# Patient Record
Sex: MALE | Race: WHITE | Employment: OTHER | URBAN - METROPOLITAN AREA
[De-identification: names, ages, dates, MRNs, and addresses within clinical notes are randomized per-mention and may not be internally consistent; named-entity substitution may affect disease eponyms.]

---

## 2018-08-17 ENCOUNTER — APPOINTMENT (EMERGENCY)
Dept: RADIOLOGY | Facility: HOSPITAL | Age: 63
End: 2018-08-17
Payer: MEDICARE

## 2018-08-17 ENCOUNTER — HOSPITAL ENCOUNTER (EMERGENCY)
Facility: HOSPITAL | Age: 63
Discharge: LEFT BEFORE DISCHARGE | End: 2018-08-17
Attending: EMERGENCY MEDICINE
Payer: MEDICARE

## 2018-08-17 VITALS
RESPIRATION RATE: 20 BRPM | OXYGEN SATURATION: 97 % | SYSTOLIC BLOOD PRESSURE: 167 MMHG | DIASTOLIC BLOOD PRESSURE: 105 MMHG | WEIGHT: 300 LBS | HEART RATE: 87 BPM | TEMPERATURE: 98.9 F

## 2018-08-17 DIAGNOSIS — S72.001A CLOSED RIGHT HIP FRACTURE (HCC): Primary | ICD-10-CM

## 2018-08-17 PROCEDURE — 99283 EMERGENCY DEPT VISIT LOW MDM: CPT

## 2018-08-17 PROCEDURE — 73502 X-RAY EXAM HIP UNI 2-3 VIEWS: CPT

## 2018-08-17 RX ORDER — ACETAMINOPHEN 325 MG/1
975 TABLET ORAL ONCE
Status: DISCONTINUED | OUTPATIENT
Start: 2018-08-17 | End: 2018-08-17 | Stop reason: HOSPADM

## 2018-08-17 RX ORDER — ACETAMINOPHEN 500 MG
500 TABLET ORAL EVERY 6 HOURS PRN
Qty: 30 TABLET | Refills: 0 | Status: SHIPPED | OUTPATIENT
Start: 2018-08-17

## 2018-08-17 RX ORDER — IBUPROFEN 400 MG/1
400 TABLET ORAL EVERY 6 HOURS PRN
Qty: 30 TABLET | Refills: 0 | Status: SHIPPED | OUTPATIENT
Start: 2018-08-17

## 2018-08-17 RX ORDER — IBUPROFEN 400 MG/1
800 TABLET ORAL ONCE
Status: DISCONTINUED | OUTPATIENT
Start: 2018-08-17 | End: 2018-08-17 | Stop reason: HOSPADM

## 2018-08-17 NOTE — ED PROVIDER NOTES
History  Chief Complaint   Patient presents with    Fall     Patient reports falling and "twisting my bad hip when I went down " Patient called 911 and they helped him get up and back into his chair but felt he needed to come to the ER for "a few pain killers to get me through until I see my family doctor " Patient denies LOC or hitting his head when he fell  History provided by:  Patient (Female significant other in room )   used: No    Fall   Mechanism of injury comment:  Slip and fall from chair  Injury location: Right hip pain  Patient denies any other injury  Patient denies striking head  Patient denies loss of conscious  Incident location: At home  Prior to arrival data:     Loss of consciousness: no      Amnesic to event: no      Airway interventions:  None    Medications administered:  None  Associated symptoms: no abdominal pain, no back pain, no difficulty breathing, no nausea and no vomiting    Risk factors: no anticoagulation therapy and no asthma    Risk factors comment:  Recently just taken off of warfarin      None       Past Medical History:   Diagnosis Date    A-fib ([facility] [address] )     Hypertension        Past Surgical History:   Procedure Laterality Date    CARDIAC DEFIBRILLATOR PLACEMENT      TOTAL HIP ARTHROPLASTY Left        History reviewed  No pertinent family history  I have reviewed and agree with the history as documented  Social History   Substance Use Topics    Smoking status: Former Smoker    Smokeless tobacco: Never Used    Alcohol use No        Review of Systems   Constitutional: Negative for chills and fever  HENT: Negative for ear pain  Eyes: Negative for redness  Respiratory: Negative for cough  Cardiovascular: Positive for leg swelling  Chronic right leg lower extremity swelling no new complaints  Gastrointestinal: Negative for abdominal pain, nausea and vomiting  Endocrine: Negative for heat intolerance     Genitourinary: Negative for flank pain  Musculoskeletal: Positive for arthralgias  Negative for back pain  Right hip pain  Skin: Negative for pallor  Allergic/Immunologic: Negative for food allergies  Hematological: Negative for adenopathy  Psychiatric/Behavioral: Negative for agitation  Physical Exam  Physical Exam   Constitutional: He is oriented to person, place, and time  He appears well-developed and well-nourished  No distress  HENT:   Head: Normocephalic and atraumatic  Eyes: Conjunctivae are normal    Neck: Neck supple  No JVD present  Cardiovascular: Normal rate  Pulmonary/Chest: Effort normal    Abdominal:   Morbidly obese abdomen  Genitourinary:   Genitourinary Comments: No complaints deferred  Musculoskeletal: He exhibits tenderness  Legs:  Neurological: He is alert and oriented to person, place, and time  He displays normal reflexes  No sensory deficit  He exhibits normal muscle tone  Coordination normal    Skin: Skin is warm and dry  Capillary refill takes less than 2 seconds  Psychiatric: He has a normal mood and affect   His behavior is normal  Judgment and thought content normal        Vital Signs  ED Triage Vitals [08/17/18 0006]   Temperature Pulse Respirations Blood Pressure SpO2   98 9 °F (37 2 °C) 87 20 (!) 167/105 97 %      Temp Source Heart Rate Source Patient Position - Orthostatic VS BP Location FiO2 (%)   Oral Monitor Sitting Right arm --      Pain Score       8           Vitals:    08/17/18 0006   BP: (!) 167/105   Pulse: 87   Patient Position - Orthostatic VS: Sitting       Visual Acuity      ED Medications  Medications   ibuprofen (MOTRIN) tablet 800 mg (800 mg Oral Not Given 8/17/18 0230)   acetaminophen (TYLENOL) tablet 975 mg (975 mg Oral Not Given 8/17/18 0230)       Diagnostic Studies  Results Reviewed     None                 XR hip/pelv 2-3 vws right   ED Interpretation by Breana Pham MD (08/17 9275)   Subcapital fracture of the right femoral neck Final Result by Ta Saaverda DO (08/17 0151)      Subcapital fracture of the right femoral neck            Workstation performed: [id number]                    Procedures  Procedures       Phone Contacts  ED Phone Contact    ED Course                               MDM  Number of Diagnoses or Management Options  Closed right hip fracture [facility]):   Diagnosis management comments: 70-year-old male presents emergency department status slip and fall from his chair at home  Patient denies loss of consciousness  Patient denies striking head  Patient denies neck pain  Patient does admit that he has been off his warfarin for up to 2-3 weeks  Patient states that the way he fell he had significant right hip pain  X-ray does demonstrate hip fracture  Patient recommended to be placed in inpatient status with orthopedic follow-up in the a m Sherre Soulier At this time patient is requesting to leave AMA due to home needs  Patient states that he needed to put his comfortable car roof up  Security as well as myself and nursing team were going to help patient go to his car and close his roof but patient continued to seek AMA status  Patient had female significant other with him in the room and became very tearful that the patient was getting Percocet for his pain  Patient pain was treated in the department with NSAIDs and Tylenol  Patient was expected to be in patient but had requested wanting to see his prior orthopedic surgeon who performed his left hip surgery  I explained that patient would need to be transferred to [facility] for this evaluation and management  Patient was initially agreeable but after discussion with his female significant other wanted to leave AMA status and then follow up next day at [facility]  I educated patient of AMA status  I did educate patient that he may have worsening symptoms permanent disability, permanent disability from work ability  Patient states that he is aware    Patient was seen and evaluated by Dr Pool Flores the attending any reviewed with the patient AMA status  Patient is bright alert and I feel is able to make appropriate decision  Patient was educated that he may return at any time for further evaluation and management  Patient was agreeable  Patient was discharged in stable condition  Amount and/or Complexity of Data Reviewed  Tests in the radiology section of CPT®: ordered and reviewed      CritCare Time    Disposition  Final diagnoses:   Closed right hip fracture ([address] )     Time reflects when diagnosis was documented in both MDM as applicable and the Disposition within this note     Time User Action Codes Description Comment    8/17/2018  2:21 AM Kimmie Teixeira Add [S72 001A] Closed right hip fracture [facility])       ED Disposition     ED Disposition Condition Comment    AMA  Date: 8/17/2018  Patient: Nik Alarcon  Admitted: 8/17/2018 12:10 AM  Attending Provider: Avelino Crawford MD    Nik Alarcon or his authorized caregiver has made the decision for the patient to leave the emergency department against the advice of attending physi jose  He or his authorized caregiver has been informed and understands the inherent risks, including death, progressive worsening fracture, worsening hip pain  He or his authorized caregiver has decided to accept the responsibility for this decision  Ambar Apa and all necessary parties have been advised that he may return for further evaluation or treatment  His condition at time of discharge was stable    Nik Alarcon had current vital signs as follows:  BP (!) 167/105 (BP Location: Right arm)   Pulse 87    Temp 98 9 °F (37 2 °C) (Oral)   Resp 20   Wt 136 kg (300 lb)         Follow-up Information     Follow up With Specialties Details Why Contact Info       Follow-up with your known hip surgery as soon as possible     Arya Glasgow MD Orthopedic Surgery   [address] [address]            Discharge Medication List as of 8/17/2018  2:30 AM      START taking these medications    Details   acetaminophen (TYLENOL) 500 mg tablet Take 1 tablet (500 mg total) by mouth every 6 (six) hours as needed for mild pain, moderate pain, headaches or fever, Starting Fri 8/17/2018, Print      ibuprofen (MOTRIN) 400 mg tablet Take 1 tablet (400 mg total) by mouth every 6 (six) hours as needed for mild pain, Starting Fri 8/17/2018, Print           No discharge procedures on file      ED Provider  Electronically Signed by           Sangeeta Parkinson PA-C  08/17/18 1929

## 2018-08-17 NOTE — ED ATTENDING ATTESTATION
Natty Godoy MD, saw and evaluated the patient  I have discussed the patient with the resident/non-physician practitioner and agree with the resident's/non-physician practitioner's findings, Plan of Care, and MDM as documented in the resident's/non-physician practitioner's note, except where noted  All available labs and Radiology studies were reviewed  At this point I agree with the current assessment done in the Emergency Department  I have conducted an independent evaluation of this patient a history and physical is as follows:  75-year-old male presents for evaluation of right hip pain after twisting and falling  Patient complains of isolated right hip pain  Denies striking his head, loss of consciousness and denies other traumatic injuries, chest pain, shortness of breath  Ten systems reviewed otherwise negative   Exam acute distress, HEENT trauma normal, neuro normal, lungs normal cardiac normal, abdomen normal, bilateral upper extremity trauma exam is within normal limits, left lower extremity trauma exam within normal limit, right hip tender palpation, right femur/knee/tib-fib/ankle exam within normal limits for medical decision making;-right hip pain-will x-ray to rule out fracture, reassess    Critical Care Time  CritCare Time    Procedures

## 2018-08-17 NOTE — DISCHARGE INSTRUCTIONS
Hip Fracture   WHAT YOU NEED TO KNOW:   A hip fracture is a break in the upper part of your femur (thigh bone)  The upper part of your femur includes the femoral head and the femoral neck  A hip fracture is often caused by a fall or injury on the side of your hip  DISCHARGE INSTRUCTIONS:   Medicines: You may  need any of the following:  · Acetaminophen  decreases pain and fever  It is available without a doctor's order  Ask how much to take and how often to take it  Follow directions  Acetaminophen can cause liver damage if not taken correctly  · Prescription pain medicine  may be given  Ask how to take this medicine safely  · Blood thinners    help prevent blood clots  Examples of blood thinners include heparin and warfarin  Clots can cause strokes, heart attacks, and death  The following are general safety guidelines to follow while you are taking a blood thinner:    ¨ Watch for bleeding and bruising while you take blood thinners  Watch for bleeding from your gums or nose  Watch for blood in your urine and bowel movements  Use a soft washcloth on your skin, and a soft toothbrush to brush your teeth  This can keep your skin and gums from bleeding  If you shave, use an electric shaver  Do not play contact sports  ¨ Tell your dentist and other healthcare providers that you take anticoagulants  Wear a bracelet or necklace that says you take this medicine  ¨ Do not start or stop any medicines unless your healthcare provider tells you to  Many medicines cannot be used with blood thinners  ¨ Tell your healthcare provider right away if you forget to take the medicine, or if you take too much  ¨ Warfarin  is a blood thinner that you may need to take  The following are things you should be aware of if you take warfarin  § Foods and medicines can affect the amount of warfarin in your blood  Do not make major changes to your diet while you take warfarin   Warfarin works best when you eat about the same amount of vitamin K every day  Vitamin K is found in green leafy vegetables and certain other foods  Ask for more information about what to eat when you are taking warfarin  § You will need to see your healthcare provider for follow-up visits when you are on warfarin  You will need regular blood tests  These tests are used to decide how much medicine you need  · Take your medicine as directed  Contact your healthcare provider if you think your medicine is not helping or if you have side effects  Tell him or her if you are allergic to any medicine  Keep a list of the medicines, vitamins, and herbs you take  Include the amounts, and when and why you take them  Bring the list or the pill bottles to follow-up visits  Carry your medicine list with you in case of an emergency  Call 911 for any of the following:   · Your leg feels warm, tender, and painful  It may look swollen and red  · You feel lightheaded, short of breath, and have chest pain  · You cough up blood  Return to the emergency department if:   · You have severe pain, even after you take pain medicine  · Your legs are numb  · You cannot move your leg or foot  Contact your healthcare provider if:   · You have a fever  · You have a blister or open sore  · You have a sore that is red, swollen, or draining pus  · You have increased pain, numbness, tingling, or leg swelling  · You have worsening function or deformity  · You have questions or concerns about your condition or care  Follow up with your healthcare provider as directed: You will need to return for more x-rays  Your healthcare provider may also want to check you for osteoporosis  Osteoporosis is a condition that causes bones to become brittle and break easily after a fall  You will need treatment if you develop osteoporosis  Write down your questions so you remember to ask them during your visits     Prevent falls:   · Talk to your healthcare provider about all of the medicines you take  Some medicines can cause dizziness or drowsiness and increase your risk for falls  · Have your vision checked regularly  Your vision may worsen over time and increase your risk for falls  · Use walking devices , such as canes or walkers, if you have trouble keeping your balance  · Make your home safe:      ¨ Improve the lighting in your home so that you can see where you are walking better  ¨ Add grab bars to the inside and outside of your tub or shower and next to the toilet  ¨ Add railings to both sides of your stairways  ¨ Remove throw rugs and other objects that can cause you to trip and fall  Manage your hip fracture:   · Eat foods that are high in calcium and vitamin D  Your healthcare provider may tell you to eat more dairy products, such as milk and cheese, for calcium  Spinach, salmon, and dried beans are also good sources of calcium  Cereal, bread, and orange juice may be fortified with vitamin D  You also get vitamin D from exposure to sunlight  Your healthcare provider may also suggest a calcium or vitamin D supplement  Do not take supplements unless directed  · Rest as directed  You may need a brace or pillow between your legs while your fracture heals  · Go to physical therapy as directed  A physical therapist will teach you exercises to help improve movement and strength, and to decrease pain during recovery  © 2017 [employer] Information is for End User's use only and may not be sold, redistributed or otherwise used for commercial purposes  All illustrations and images included in CareNotes® are the copyrighted property of [employer]  or Nolberto Johnson  The above information is an  only  It is not intended as medical advice for individual conditions or treatments   Talk to your doctor, nurse or pharmacist before following any medical regimen to see if it is safe and effective for you